# Patient Record
Sex: FEMALE | Race: BLACK OR AFRICAN AMERICAN | NOT HISPANIC OR LATINO | Employment: UNEMPLOYED | URBAN - METROPOLITAN AREA
[De-identification: names, ages, dates, MRNs, and addresses within clinical notes are randomized per-mention and may not be internally consistent; named-entity substitution may affect disease eponyms.]

---

## 2017-05-17 LAB — POC BETA-HCG (QUAL): NEGATIVE

## 2017-05-31 ENCOUNTER — HISTORICAL (OUTPATIENT)
Dept: ADMINISTRATIVE | Facility: HOSPITAL | Age: 22
End: 2017-05-31

## 2017-05-31 LAB
BUN SERPL-MCNC: 7 MG/DL (ref 7–25)
CALCIUM SERPL-MCNC: 9.5 MG/DL (ref 8.4–10.3)
CHLORIDE SERPL-SCNC: 105 MMOL/L (ref 96–110)
CO2 SERPL-SCNC: 26 MMOL/L (ref 24–32)
CREAT SERPL-MCNC: 0.67 MG/DL (ref 0.7–1.1)
CRP SERPL-MCNC: <0.5 MG/DL
ERYTHROCYTE [SEDIMENTATION RATE] IN BLOOD: 8 MM/HR (ref 0–20)
GLUCOSE SERPL-MCNC: 80 MG/DL (ref 65–99)
HIV 1+2 AB+HIV1 P24 AG SERPL QL IA: NONREACTIVE
POTASSIUM SERPL-SCNC: 3.4 MMOL/L (ref 3.6–5.2)
RPR SER QL: NORMAL
SODIUM SERPL-SCNC: 139 MMOL/L (ref 135–146)

## 2018-10-10 ENCOUNTER — HISTORICAL (OUTPATIENT)
Dept: ADMINISTRATIVE | Facility: HOSPITAL | Age: 23
End: 2018-10-10

## 2018-10-10 LAB
ABS NEUT (OLG): 1.84 X10(3)/MCL (ref 2.1–9.2)
BASOPHILS # BLD AUTO: 0.09 X10(3)/MCL
BASOPHILS NFR BLD AUTO: 2 %
BUN SERPL-MCNC: 6 MG/DL (ref 7–18)
CALCIUM SERPL-MCNC: 8.8 MG/DL (ref 8.5–10.1)
CHLORIDE SERPL-SCNC: 104 MMOL/L (ref 98–107)
CO2 SERPL-SCNC: 29 MMOL/L (ref 21–32)
CREAT SERPL-MCNC: 0.7 MG/DL (ref 0.6–1.3)
CREAT/UREA NIT SERPL: 9
EOSINOPHIL # BLD AUTO: 0.11 X10(3)/MCL
EOSINOPHIL NFR BLD AUTO: 2 %
ERYTHROCYTE [DISTWIDTH] IN BLOOD BY AUTOMATED COUNT: 14.1 % (ref 11.5–14.5)
GLUCOSE SERPL-MCNC: 75 MG/DL (ref 74–106)
HCT VFR BLD AUTO: 33.6 % (ref 35–46)
HGB BLD-MCNC: 11.7 GM/DL (ref 12–16)
LYMPHOCYTES # BLD AUTO: 2.14 X10(3)/MCL
LYMPHOCYTES NFR BLD AUTO: 48 % (ref 13–40)
MCH RBC QN AUTO: 30.1 PG (ref 26–34)
MCHC RBC AUTO-ENTMCNC: 34.8 GM/DL (ref 31–37)
MCV RBC AUTO: 86.4 FL (ref 80–100)
MONOCYTES # BLD AUTO: 0.28 X10(3)/MCL
MONOCYTES NFR BLD AUTO: 6 % (ref 4–12)
NEUTROPHILS # BLD AUTO: 1.84 X10(3)/MCL
NEUTROPHILS NFR BLD AUTO: 41 X10(3)/MCL
PLATELET # BLD AUTO: 285 X10(3)/MCL (ref 130–400)
PMV BLD AUTO: 11.4 FL (ref 7.4–10.4)
POTASSIUM SERPL-SCNC: 3.8 MMOL/L (ref 3.5–5.1)
RBC # BLD AUTO: 3.89 X10(6)/MCL (ref 4–5.2)
SODIUM SERPL-SCNC: 138 MMOL/L (ref 136–145)
TSH SERPL-ACNC: 1.2 MIU/L (ref 0.36–3.74)
WBC # SPEC AUTO: 4.5 X10(3)/MCL (ref 4.5–11)

## 2018-10-22 LAB — POC BETA-HCG (QUAL): NEGATIVE

## 2019-02-12 LAB
HIGH RISK HPV 16 (PRECISION): NEGATIVE
HIGH RISK HPV 18/45 (PRECISION): NEGATIVE
PAP RECOMMENDATION EXT: ABNORMAL
PAP SMEAR: ABNORMAL
POC BETA-HCG (QUAL): NEGATIVE

## 2019-05-23 LAB — POC BETA-HCG (QUAL): NEGATIVE

## 2020-11-11 ENCOUNTER — HISTORICAL (OUTPATIENT)
Dept: ADMINISTRATIVE | Facility: HOSPITAL | Age: 25
End: 2020-11-11

## 2020-11-11 LAB
ALBUMIN SERPL-MCNC: 4.5 GM/DL (ref 3.5–5)
ALP SERPL-CCNC: 56 UNIT/L (ref 40–150)
ALT SERPL-CCNC: 14 UNIT/L (ref 0–55)
AST SERPL-CCNC: 18 UNIT/L (ref 5–34)
BILIRUB SERPL-MCNC: 0.4 MG/DL
BILIRUBIN DIRECT+TOT PNL SERPL-MCNC: 0.2 MG/DL (ref 0–0.5)
BILIRUBIN DIRECT+TOT PNL SERPL-MCNC: 0.2 MG/DL (ref 0–0.8)
HIV 1+2 AB+HIV1 P24 AG SERPL QL IA: NONREACTIVE
PAP RECOMMENDATION EXT: NORMAL
PAP SMEAR: NORMAL
PROT SERPL-MCNC: 8 GM/DL (ref 6.4–8.3)
T PALLIDUM AB SER QL: NONREACTIVE

## 2021-05-24 ENCOUNTER — HISTORICAL (OUTPATIENT)
Dept: ADMINISTRATIVE | Facility: HOSPITAL | Age: 26
End: 2021-05-24

## 2021-05-24 LAB
HIV 1+2 AB+HIV1 P24 AG SERPL QL IA: NONREACTIVE
T PALLIDUM AB SER QL: NONREACTIVE

## 2021-11-17 ENCOUNTER — HISTORICAL (OUTPATIENT)
Dept: ADMINISTRATIVE | Facility: HOSPITAL | Age: 26
End: 2021-11-17

## 2021-11-17 LAB
ALBUMIN SERPL-MCNC: 4.5 GM/DL (ref 3.5–5)
ALBUMIN/GLOB SERPL: 1.2 RATIO (ref 1.1–2)
ALP SERPL-CCNC: 46 UNIT/L (ref 40–150)
ALT SERPL-CCNC: 8 UNIT/L (ref 0–55)
AST SERPL-CCNC: 18 UNIT/L (ref 5–34)
BILIRUB SERPL-MCNC: 0.9 MG/DL
BILIRUBIN DIRECT+TOT PNL SERPL-MCNC: 0.4 MG/DL (ref 0–0.5)
BILIRUBIN DIRECT+TOT PNL SERPL-MCNC: 0.5 MG/DL (ref 0–0.8)
BUN SERPL-MCNC: 5.5 MG/DL (ref 7–18.7)
CALCIUM SERPL-MCNC: 9.8 MG/DL (ref 8.7–10.5)
CHLORIDE SERPL-SCNC: 103 MMOL/L (ref 98–107)
CO2 SERPL-SCNC: 27 MMOL/L (ref 22–29)
CREAT SERPL-MCNC: 0.77 MG/DL (ref 0.55–1.02)
GLOBULIN SER-MCNC: 3.7 GM/DL (ref 2.4–3.5)
GLUCOSE SERPL-MCNC: 81 MG/DL (ref 74–100)
POTASSIUM SERPL-SCNC: 3.9 MMOL/L (ref 3.5–5.1)
PROT SERPL-MCNC: 8.2 GM/DL (ref 6.4–8.3)
SODIUM SERPL-SCNC: 137 MMOL/L (ref 136–145)
TSH SERPL-ACNC: 0.85 UIU/ML (ref 0.35–4.94)

## 2022-04-10 ENCOUNTER — HISTORICAL (OUTPATIENT)
Dept: ADMINISTRATIVE | Facility: HOSPITAL | Age: 27
End: 2022-04-10
Payer: MEDICAID

## 2022-04-29 VITALS
HEIGHT: 60 IN | OXYGEN SATURATION: 100 % | BODY MASS INDEX: 21.94 KG/M2 | DIASTOLIC BLOOD PRESSURE: 76 MMHG | SYSTOLIC BLOOD PRESSURE: 109 MMHG | WEIGHT: 111.75 LBS

## 2022-06-06 ENCOUNTER — OFFICE VISIT (OUTPATIENT)
Dept: FAMILY MEDICINE | Facility: CLINIC | Age: 27
End: 2022-06-06
Payer: MEDICAID

## 2022-06-06 VITALS
TEMPERATURE: 98 F | BODY MASS INDEX: 22.9 KG/M2 | OXYGEN SATURATION: 100 % | WEIGHT: 116.63 LBS | HEIGHT: 60 IN | RESPIRATION RATE: 20 BRPM | HEART RATE: 75 BPM | DIASTOLIC BLOOD PRESSURE: 84 MMHG | SYSTOLIC BLOOD PRESSURE: 120 MMHG

## 2022-06-06 DIAGNOSIS — F31.9 BIPOLAR AFFECTIVE DISORDER, REMISSION STATUS UNSPECIFIED: Primary | ICD-10-CM

## 2022-06-06 PROCEDURE — 99214 OFFICE O/P EST MOD 30 MIN: CPT | Mod: PBBFAC

## 2022-06-06 PROCEDURE — 93005 ELECTROCARDIOGRAM TRACING: CPT

## 2022-06-06 RX ORDER — QUETIAPINE FUMARATE 50 MG/1
50 TABLET, FILM COATED ORAL DAILY
Qty: 30 TABLET | Refills: 0 | Status: SHIPPED | OUTPATIENT
Start: 2022-06-06 | End: 2022-07-19 | Stop reason: ALTCHOICE
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
## 2022-06-22 ENCOUNTER — OFFICE VISIT (OUTPATIENT)
Dept: FAMILY MEDICINE | Facility: CLINIC | Age: 27
End: 2022-06-22
Payer: MEDICAID

## 2022-06-22 DIAGNOSIS — F31.32 BIPOLAR 1 DISORDER, DEPRESSED, MODERATE: Primary | ICD-10-CM

## 2022-06-22 PROCEDURE — 99417 PROLNG OP E/M EACH 15 MIN: CPT | Mod: 95,,, | Performed by: NURSE PRACTITIONER

## 2022-06-22 PROCEDURE — 99417 PR PROLONGED SVC, OUTPT, W/WO DIRECT PT CONTACT,  EA ADDTL 15 MIN: ICD-10-PCS | Mod: 95,,, | Performed by: NURSE PRACTITIONER

## 2022-06-22 PROCEDURE — 1159F PR MEDICATION LIST DOCUMENTED IN MEDICAL RECORD: ICD-10-PCS | Mod: CPTII,95,, | Performed by: NURSE PRACTITIONER

## 2022-06-22 PROCEDURE — 1159F MED LIST DOCD IN RCRD: CPT | Mod: CPTII,95,, | Performed by: NURSE PRACTITIONER

## 2022-06-22 PROCEDURE — 99205 PR OFFICE/OUTPT VISIT, NEW, LEVL V, 60-74 MIN: ICD-10-PCS | Mod: 95,,, | Performed by: NURSE PRACTITIONER

## 2022-06-22 PROCEDURE — 99205 OFFICE O/P NEW HI 60 MIN: CPT | Mod: 95,,, | Performed by: NURSE PRACTITIONER

## 2022-06-22 RX ORDER — ESCITALOPRAM OXALATE 10 MG/1
10 TABLET ORAL DAILY
COMMUNITY
Start: 2022-05-02 | End: 2022-07-19 | Stop reason: ALTCHOICE
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
## 2022-06-22 NOTE — PATIENT INSTRUCTIONS
Meds as directed  Keep all healthcare appointments  Utilize self-interventions from patient education materials  Nearest ER if  overwhelmed   Clinic 4 weeks

## 2022-07-19 ENCOUNTER — OFFICE VISIT (OUTPATIENT)
Dept: BEHAVIORAL HEALTH | Facility: CLINIC | Age: 27
End: 2022-07-19
Payer: MEDICAID

## 2022-07-19 VITALS
HEART RATE: 73 BPM | HEIGHT: 60 IN | RESPIRATION RATE: 20 BRPM | BODY MASS INDEX: 23.68 KG/M2 | SYSTOLIC BLOOD PRESSURE: 112 MMHG | DIASTOLIC BLOOD PRESSURE: 79 MMHG | OXYGEN SATURATION: 100 % | WEIGHT: 120.63 LBS

## 2022-07-19 DIAGNOSIS — F31.32 BIPOLAR 1 DISORDER, DEPRESSED, MODERATE: Primary | ICD-10-CM

## 2022-07-19 DIAGNOSIS — F43.12 CHRONIC POST-TRAUMATIC STRESS DISORDER (PTSD): ICD-10-CM

## 2022-07-19 PROCEDURE — 3008F PR BODY MASS INDEX (BMI) DOCUMENTED: ICD-10-PCS | Mod: CPTII,,, | Performed by: STUDENT IN AN ORGANIZED HEALTH CARE EDUCATION/TRAINING PROGRAM

## 2022-07-19 PROCEDURE — 99215 PR OFFICE/OUTPT VISIT, EST, LEVL V, 40-54 MIN: ICD-10-PCS | Mod: AF,HB,S$PBB, | Performed by: STUDENT IN AN ORGANIZED HEALTH CARE EDUCATION/TRAINING PROGRAM

## 2022-07-19 PROCEDURE — 1160F PR REVIEW ALL MEDS BY PRESCRIBER/CLIN PHARMACIST DOCUMENTED: ICD-10-PCS | Mod: CPTII,,, | Performed by: STUDENT IN AN ORGANIZED HEALTH CARE EDUCATION/TRAINING PROGRAM

## 2022-07-19 PROCEDURE — 1159F PR MEDICATION LIST DOCUMENTED IN MEDICAL RECORD: ICD-10-PCS | Mod: CPTII,,, | Performed by: STUDENT IN AN ORGANIZED HEALTH CARE EDUCATION/TRAINING PROGRAM

## 2022-07-19 PROCEDURE — 99213 OFFICE O/P EST LOW 20 MIN: CPT | Mod: PBBFAC,PN | Performed by: STUDENT IN AN ORGANIZED HEALTH CARE EDUCATION/TRAINING PROGRAM

## 2022-07-19 PROCEDURE — 3074F PR MOST RECENT SYSTOLIC BLOOD PRESSURE < 130 MM HG: ICD-10-PCS | Mod: CPTII,,, | Performed by: STUDENT IN AN ORGANIZED HEALTH CARE EDUCATION/TRAINING PROGRAM

## 2022-07-19 PROCEDURE — 3008F BODY MASS INDEX DOCD: CPT | Mod: CPTII,,, | Performed by: STUDENT IN AN ORGANIZED HEALTH CARE EDUCATION/TRAINING PROGRAM

## 2022-07-19 PROCEDURE — 3078F DIAST BP <80 MM HG: CPT | Mod: CPTII,,, | Performed by: STUDENT IN AN ORGANIZED HEALTH CARE EDUCATION/TRAINING PROGRAM

## 2022-07-19 PROCEDURE — 1159F MED LIST DOCD IN RCRD: CPT | Mod: CPTII,,, | Performed by: STUDENT IN AN ORGANIZED HEALTH CARE EDUCATION/TRAINING PROGRAM

## 2022-07-19 PROCEDURE — 99215 OFFICE O/P EST HI 40 MIN: CPT | Mod: AF,HB,S$PBB, | Performed by: STUDENT IN AN ORGANIZED HEALTH CARE EDUCATION/TRAINING PROGRAM

## 2022-07-19 PROCEDURE — 1160F RVW MEDS BY RX/DR IN RCRD: CPT | Mod: CPTII,,, | Performed by: STUDENT IN AN ORGANIZED HEALTH CARE EDUCATION/TRAINING PROGRAM

## 2022-07-19 PROCEDURE — 3074F SYST BP LT 130 MM HG: CPT | Mod: CPTII,,, | Performed by: STUDENT IN AN ORGANIZED HEALTH CARE EDUCATION/TRAINING PROGRAM

## 2022-07-19 PROCEDURE — 3078F PR MOST RECENT DIASTOLIC BLOOD PRESSURE < 80 MM HG: ICD-10-PCS | Mod: CPTII,,, | Performed by: STUDENT IN AN ORGANIZED HEALTH CARE EDUCATION/TRAINING PROGRAM

## 2022-07-19 RX ORDER — LITHIUM CARBONATE 300 MG/1
300 CAPSULE ORAL 2 TIMES DAILY
Qty: 60 CAPSULE | Refills: 2 | Status: SHIPPED | OUTPATIENT
Start: 2022-07-19 | End: 2023-02-22
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
## 2022-08-03 ENCOUNTER — OFFICE VISIT (OUTPATIENT)
Dept: FAMILY MEDICINE | Facility: CLINIC | Age: 27
End: 2022-08-03
Payer: MEDICAID

## 2022-08-03 DIAGNOSIS — F31.32 BIPOLAR 1 DISORDER, DEPRESSED, MODERATE: Primary | ICD-10-CM

## 2022-08-03 DIAGNOSIS — F43.10 POSTTRAUMATIC STRESS DISORDER: ICD-10-CM

## 2022-08-03 PROCEDURE — 1159F MED LIST DOCD IN RCRD: CPT | Mod: CPTII,95,SA,HB | Performed by: NURSE PRACTITIONER

## 2022-08-03 PROCEDURE — 99215 OFFICE O/P EST HI 40 MIN: CPT | Mod: 95,SA,HB, | Performed by: NURSE PRACTITIONER

## 2022-08-03 PROCEDURE — 1159F PR MEDICATION LIST DOCUMENTED IN MEDICAL RECORD: ICD-10-PCS | Mod: CPTII,95,SA,HB | Performed by: NURSE PRACTITIONER

## 2022-08-03 PROCEDURE — 90836 PSYTX W PT W E/M 45 MIN: CPT | Mod: 95,SA,HB, | Performed by: NURSE PRACTITIONER

## 2022-08-03 PROCEDURE — 90836 PR PSYCHOTHERAPY W/PATIENT W/E&M, 45 MIN (ADD ON): ICD-10-PCS | Mod: 95,SA,HB, | Performed by: NURSE PRACTITIONER

## 2022-08-03 PROCEDURE — 99215 PR OFFICE/OUTPT VISIT, EST, LEVL V, 40-54 MIN: ICD-10-PCS | Mod: 95,SA,HB, | Performed by: NURSE PRACTITIONER

## 2022-08-03 NOTE — PATIENT INSTRUCTIONS
Meds as directed--arrange to have mother  Lithium Rx today  Check with MD or pharmacy to determine if tapering off Lamictal is needed before beginning Lithium  Keep all healthcare appointments  Utilize self-interventions from patient education materials  Nearest ER if overwhelmed   Clinic 4 weeks
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
## 2022-09-14 ENCOUNTER — OFFICE VISIT (OUTPATIENT)
Dept: FAMILY MEDICINE | Facility: CLINIC | Age: 27
End: 2022-09-14
Payer: MEDICAID

## 2022-09-14 VITALS
RESPIRATION RATE: 20 BRPM | OXYGEN SATURATION: 100 % | SYSTOLIC BLOOD PRESSURE: 103 MMHG | HEART RATE: 70 BPM | HEIGHT: 60 IN | TEMPERATURE: 98 F | DIASTOLIC BLOOD PRESSURE: 70 MMHG | BODY MASS INDEX: 23.77 KG/M2 | WEIGHT: 121.06 LBS

## 2022-09-14 DIAGNOSIS — F43.10 POSTTRAUMATIC STRESS DISORDER: ICD-10-CM

## 2022-09-14 DIAGNOSIS — F31.32 BIPOLAR 1 DISORDER, DEPRESSED, MODERATE: Primary | ICD-10-CM

## 2022-09-14 PROCEDURE — 90838 PR PSYCHOTHERAPY W/PATIENT W/E&M, 60 MIN (ADD ON): ICD-10-PCS | Mod: S$PBB,SA,HB, | Performed by: NURSE PRACTITIONER

## 2022-09-14 PROCEDURE — 1159F PR MEDICATION LIST DOCUMENTED IN MEDICAL RECORD: ICD-10-PCS | Mod: CPTII,SA,HB, | Performed by: NURSE PRACTITIONER

## 2022-09-14 PROCEDURE — 99213 OFFICE O/P EST LOW 20 MIN: CPT | Mod: S$PBB,SA,HB, | Performed by: NURSE PRACTITIONER

## 2022-09-14 PROCEDURE — 90838 PSYTX W PT W E/M 60 MIN: CPT | Mod: S$PBB,SA,HB, | Performed by: NURSE PRACTITIONER

## 2022-09-14 PROCEDURE — 3074F SYST BP LT 130 MM HG: CPT | Mod: CPTII,SA,HB, | Performed by: NURSE PRACTITIONER

## 2022-09-14 PROCEDURE — 90838 PSYTX W PT W E/M 60 MIN: CPT | Mod: PBBFAC | Performed by: NURSE PRACTITIONER

## 2022-09-14 PROCEDURE — 3008F BODY MASS INDEX DOCD: CPT | Mod: CPTII,SA,HB, | Performed by: NURSE PRACTITIONER

## 2022-09-14 PROCEDURE — 3078F PR MOST RECENT DIASTOLIC BLOOD PRESSURE < 80 MM HG: ICD-10-PCS | Mod: CPTII,SA,HB, | Performed by: NURSE PRACTITIONER

## 2022-09-14 PROCEDURE — 99213 PR OFFICE/OUTPT VISIT, EST, LEVL III, 20-29 MIN: ICD-10-PCS | Mod: S$PBB,SA,HB, | Performed by: NURSE PRACTITIONER

## 2022-09-14 PROCEDURE — 99214 OFFICE O/P EST MOD 30 MIN: CPT | Mod: PBBFAC | Performed by: NURSE PRACTITIONER

## 2022-09-14 PROCEDURE — 3078F DIAST BP <80 MM HG: CPT | Mod: CPTII,SA,HB, | Performed by: NURSE PRACTITIONER

## 2022-09-14 PROCEDURE — 1159F MED LIST DOCD IN RCRD: CPT | Mod: CPTII,SA,HB, | Performed by: NURSE PRACTITIONER

## 2022-09-14 PROCEDURE — 3074F PR MOST RECENT SYSTOLIC BLOOD PRESSURE < 130 MM HG: ICD-10-PCS | Mod: CPTII,SA,HB, | Performed by: NURSE PRACTITIONER

## 2022-09-14 PROCEDURE — 3008F PR BODY MASS INDEX (BMI) DOCUMENTED: ICD-10-PCS | Mod: CPTII,SA,HB, | Performed by: NURSE PRACTITIONER

## 2022-09-14 NOTE — PATIENT INSTRUCTIONS
Meds as directed  Keep all healthcare appointments  Utilize deep breathing and visualization  Obtain Anxiety/Worry Workbook by [employer].  Begin reading and completing worksheets  Utilize self-interventions from patient education materials  Nearest ER if overwhelmed   Clinic 4  weeks
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
## 2022-09-21 ENCOUNTER — HISTORICAL (OUTPATIENT)
Dept: ADMINISTRATIVE | Facility: HOSPITAL | Age: 27
End: 2022-09-21
Payer: MEDICAID

## 2022-09-26 ENCOUNTER — OFFICE VISIT (OUTPATIENT)
Dept: BEHAVIORAL HEALTH | Facility: CLINIC | Age: 27
End: 2022-09-26
Payer: MEDICAID

## 2022-09-26 VITALS
RESPIRATION RATE: 20 BRPM | HEART RATE: 70 BPM | SYSTOLIC BLOOD PRESSURE: 110 MMHG | BODY MASS INDEX: 23.32 KG/M2 | WEIGHT: 118.81 LBS | OXYGEN SATURATION: 99 % | DIASTOLIC BLOOD PRESSURE: 64 MMHG | HEIGHT: 60 IN

## 2022-09-26 DIAGNOSIS — F43.12 CHRONIC POST-TRAUMATIC STRESS DISORDER (PTSD): ICD-10-CM

## 2022-09-26 DIAGNOSIS — F31.32 BIPOLAR 1 DISORDER, DEPRESSED, MODERATE: Primary | ICD-10-CM

## 2022-09-26 PROCEDURE — 3078F PR MOST RECENT DIASTOLIC BLOOD PRESSURE < 80 MM HG: ICD-10-PCS | Mod: CPTII,,, | Performed by: STUDENT IN AN ORGANIZED HEALTH CARE EDUCATION/TRAINING PROGRAM

## 2022-09-26 PROCEDURE — 3074F SYST BP LT 130 MM HG: CPT | Mod: CPTII,,, | Performed by: STUDENT IN AN ORGANIZED HEALTH CARE EDUCATION/TRAINING PROGRAM

## 2022-09-26 PROCEDURE — 3078F DIAST BP <80 MM HG: CPT | Mod: CPTII,,, | Performed by: STUDENT IN AN ORGANIZED HEALTH CARE EDUCATION/TRAINING PROGRAM

## 2022-09-26 PROCEDURE — 3008F BODY MASS INDEX DOCD: CPT | Mod: CPTII,,, | Performed by: STUDENT IN AN ORGANIZED HEALTH CARE EDUCATION/TRAINING PROGRAM

## 2022-09-26 PROCEDURE — 99213 OFFICE O/P EST LOW 20 MIN: CPT | Mod: PBBFAC,PN | Performed by: STUDENT IN AN ORGANIZED HEALTH CARE EDUCATION/TRAINING PROGRAM

## 2022-09-26 PROCEDURE — 3074F PR MOST RECENT SYSTOLIC BLOOD PRESSURE < 130 MM HG: ICD-10-PCS | Mod: CPTII,,, | Performed by: STUDENT IN AN ORGANIZED HEALTH CARE EDUCATION/TRAINING PROGRAM

## 2022-09-26 PROCEDURE — 3008F PR BODY MASS INDEX (BMI) DOCUMENTED: ICD-10-PCS | Mod: CPTII,,, | Performed by: STUDENT IN AN ORGANIZED HEALTH CARE EDUCATION/TRAINING PROGRAM

## 2022-09-26 PROCEDURE — 99213 OFFICE O/P EST LOW 20 MIN: CPT | Mod: AF,HB,S$PBB, | Performed by: STUDENT IN AN ORGANIZED HEALTH CARE EDUCATION/TRAINING PROGRAM

## 2022-09-26 PROCEDURE — 1159F MED LIST DOCD IN RCRD: CPT | Mod: CPTII,,, | Performed by: STUDENT IN AN ORGANIZED HEALTH CARE EDUCATION/TRAINING PROGRAM

## 2022-09-26 PROCEDURE — 99213 PR OFFICE/OUTPT VISIT, EST, LEVL III, 20-29 MIN: ICD-10-PCS | Mod: AF,HB,S$PBB, | Performed by: STUDENT IN AN ORGANIZED HEALTH CARE EDUCATION/TRAINING PROGRAM

## 2022-09-26 PROCEDURE — 1160F PR REVIEW ALL MEDS BY PRESCRIBER/CLIN PHARMACIST DOCUMENTED: ICD-10-PCS | Mod: CPTII,,, | Performed by: STUDENT IN AN ORGANIZED HEALTH CARE EDUCATION/TRAINING PROGRAM

## 2022-09-26 PROCEDURE — 1160F RVW MEDS BY RX/DR IN RCRD: CPT | Mod: CPTII,,, | Performed by: STUDENT IN AN ORGANIZED HEALTH CARE EDUCATION/TRAINING PROGRAM

## 2022-09-26 PROCEDURE — 1159F PR MEDICATION LIST DOCUMENTED IN MEDICAL RECORD: ICD-10-PCS | Mod: CPTII,,, | Performed by: STUDENT IN AN ORGANIZED HEALTH CARE EDUCATION/TRAINING PROGRAM

## 2022-09-26 NOTE — PROGRESS NOTES
"Outpatient Psychiatry Follow-Up Visit    9/26/2022    Clinical Status of Patient:  Outpatient (Ambulatory)    Chief Complaint:  Amanda Coffman is a 27 y.o. female who presents today for follow-up of bipolar disorder and PTSD. Patient last seen for initial evaluation on 7/19/2022. Of note, pt no show for appt 8/22/2022.  Met with patient.      Interval History and Content of Current Session:  Interim Events/Subjective Report/Content of Current Session:   Pt reports feeling "ok" since last visit.  Mood "pretty good," denies overt depression or joseph/hypomania.  Sleeping "ok," sleeping 8 hrs nightly, rested on awakening.  Appetite normal for pt, weight intermittently increasing and decreasing.  Some difficulty with motivation, +procrastination.  Denies SI/HI/AVH/paranoia, denies plan or desire for self harm or harm to others.  Denies increased urination or nocturia.  Denies tremor.  Denies SE from current med regimen.  Has not gotten labwork done after last visit.      Psychiatric Review of Systems-is patient experiencing or having changes in  Anxiety: better  Sleep: good, 8 hrs nightly  Appetite: stable  Weight: "up and down"  Energy: good  Concentration: good   Libido: denies problem  Irritability: denies  Motivation: some difficulty  Guilt/hopelessness: denies  Paranoia/delusions: denies  SIB/risky behavior: denies    Review of Systems   PSYCHIATRIC: Pertinant items are noted in the narrative.  CONSTITUTIONAL: No weight gain or loss.  MUSCULOSKELETAL: No pain or stiffness of the joints.  NEUROLOGIC: No weakness, sensory changes, seizures, confusion, memory loss, tremor or other abnormal movements.  CARDIAC: No CP, no palpitations  RESPIRATORY: No shortness of breath.  CARDIOVASCULAR: No tachycardia or chest pain.  GASTROINTESTINAL: No nausea, vomiting, pain, constipation or diarrhea.    Past Medical, Family and Social History: The patient's past medical, family and social history have been reviewed and " "updated as appropriate within the electronic medical record - see encounter notes.    Compliance: sometimes misses doses     Side effects: denies    Risk Parameters:  Patient reports no suicidal ideation  Patient reports no homicidal ideation  Patient reports no self-injurious behavior  Patient reports no violent behavior    Exam (detailed: at least 9 elements; comprehensive: all 15 elements)   Constitutional  Vitals:  Most recent vital signs, dated less than 90 days prior to this appointment, were reviewed.     Vitals:    09/26/22 1143   BP: 110/64   Pulse: 70   Resp: 20   SpO2: 99%   Weight: 53.9 kg (118 lb 12.8 oz)   Height: 5' (1.524 m)        General:   Constitutional: No acute distress, appears stated age, casually dressed    Neurologic:   Motor: moves all extremities spontaneously and without difficulty, no abnormal involuntary movements observed  Gait: normal gait and station    Mental status examination:   Appearance: appears stated age, casually dressed, no acute distress  Behavior: unremarkable for situation, calm and cooperative  Mood: "good"  Affect: mood congruent and euthymic  Thought process: linear and goal directed  Associations: appropriate for conversation  Thought content: no plan or desire for self harm or harm to others, denies paranoia, no delusional ideation volunteered  Perceptions: denies hallucinations or other altered perceptions  Orientation: oriented to day of week, month, year, location, and situation  Language: English, fluid  Attention: able to attend to interview  Insight: good  Judgement: good    PHQ9 9/26/2022   Total Score 6       GAD7 9/26/2022 9/14/2022 7/19/2022   1. Feeling nervous, anxious, or on edge? 0 1 0   2. Not being able to stop or control worrying? 3 3 0   3. Worrying too much about different things? 3 3 1   4. Trouble relaxing? 3 2 0   5. Being so restless that it is hard to sit still? 0 0 0   6. Becoming easily annoyed or irritable? 2 3 0   7. Feeling afraid as if " something awful might happen? 1 1 2   8. If you checked off any problems, how difficult have these problems made it for you to do your work, take care of things at home, or get along with other people? 1 1 1   EVERETT-7 Score 12 13 3     Assessment and Diagnosis   Status/Progress: Based on the examination today, the patient's problem(s) is/are improved.  New problems have not been presented today.   Co-morbidities and Lack of compliance are not complicating management of the primary condition.       General Impression:    ICD-10-CM ICD-9-CM   1. Bipolar 1 disorder, depressed, moderate  F31.32 296.52   2. Chronic post-traumatic stress disorder (PTSD)  F43.12 309.81     Intervention/Counseling/Treatment Plan   Continue lithium 300mg bid, discussed option to consolidate to single nighttime dose, discussed importance of labwork to monitor for SE and guide dosing  Consider trial of prazosin for post traumatic symptoms  Recommend pt continue with current counselor  Will obtain: CBC, CMP, TSH, UDS, hemoglobin a1c, FLP, lithium level  No need for PEC as pt is not an imminent danger to self or others or gravely disabled due to acute psychiatric illness  Discussed that pt should either call clinic for psychiatric crisis symptoms or present to nearest emergency room    Discussed with patient informed consent including diagnosis, risks and benefits of proposed treatment above vs. alternative treatments vs. no treatment, as well as serious and common side effects of these treatments, and the inherent unpredictability of individual responses to these treatments. The patient expresses understanding of the above and displays the capacity to agree with this current plan. Patient also agrees that, currently, the benefits outweigh the risks and would like to pursue treatment at this time, and had no other questions.    Instructions:  Take all medications as prescribed.    Abstain from recreational drugs and alcohol.  Present to ED or call  911 for SI/HI plan or intent, psychosis, or medical emergency.    Return to Clinic: Follow up in about 8 weeks (around 11/21/2022).    Total time:   Complexity (level) of medical decision making employed in the encounter: MODERATE    The total time for services performed on the date of the encounter: 15 minutes    Iftikhar Valdez MD  [facility]

## 2023-02-22 ENCOUNTER — OFFICE VISIT (OUTPATIENT)
Dept: FAMILY MEDICINE | Facility: CLINIC | Age: 28
End: 2023-02-22
Payer: MEDICAID

## 2023-02-22 VITALS
HEIGHT: 60 IN | BODY MASS INDEX: 23.24 KG/M2 | SYSTOLIC BLOOD PRESSURE: 107 MMHG | HEART RATE: 77 BPM | WEIGHT: 118.38 LBS | OXYGEN SATURATION: 99 % | TEMPERATURE: 98 F | DIASTOLIC BLOOD PRESSURE: 74 MMHG | RESPIRATION RATE: 18 BRPM

## 2023-02-22 DIAGNOSIS — Z23 IMMUNIZATION DUE: ICD-10-CM

## 2023-02-22 DIAGNOSIS — Z00.00 HEALTH CARE MAINTENANCE: ICD-10-CM

## 2023-02-22 DIAGNOSIS — Z11.59 ENCOUNTER FOR HEPATITIS C SCREENING TEST FOR LOW RISK PATIENT: ICD-10-CM

## 2023-02-22 DIAGNOSIS — F31.9 BIPOLAR I DISORDER WITH DEPRESSION: Primary | ICD-10-CM

## 2023-02-22 PROBLEM — D57.3 SICKLE CELL TRAIT: Status: ACTIVE | Noted: 2023-02-22

## 2023-02-22 PROBLEM — F41.9 ANXIETY: Status: ACTIVE | Noted: 2023-02-22

## 2023-02-22 LAB
BASOPHILS # BLD AUTO: 0.08 X10(3)/MCL (ref 0–0.2)
BASOPHILS NFR BLD AUTO: 1.4 %
CHOLEST SERPL-MCNC: 146 MG/DL
CHOLEST/HDLC SERPL: 2 {RATIO} (ref 0–5)
DEPRECATED CALCIDIOL+CALCIFEROL SERPL-MC: 7.9 NG/ML (ref 30–80)
EOSINOPHIL # BLD AUTO: 0.08 X10(3)/MCL (ref 0–0.9)
EOSINOPHIL NFR BLD AUTO: 1.4 %
ERYTHROCYTE [DISTWIDTH] IN BLOOD BY AUTOMATED COUNT: 14.4 % (ref 11.5–17)
EST. AVERAGE GLUCOSE BLD GHB EST-MCNC: 88.2 MG/DL
HBA1C MFR BLD: 4.7 %
HCT VFR BLD AUTO: 33.4 % (ref 37–47)
HCV AB SERPL QL IA: NONREACTIVE
HDLC SERPL-MCNC: 72 MG/DL (ref 35–60)
HGB BLD-MCNC: 11.5 G/DL (ref 12–16)
IMM GRANULOCYTES # BLD AUTO: 0.01 X10(3)/MCL (ref 0–0.04)
IMM GRANULOCYTES NFR BLD AUTO: 0.2 %
LDLC SERPL CALC-MCNC: 67 MG/DL (ref 50–140)
LYMPHOCYTES # BLD AUTO: 2.33 X10(3)/MCL (ref 0.6–4.6)
LYMPHOCYTES NFR BLD AUTO: 40.9 %
MCH RBC QN AUTO: 29.5 PG
MCHC RBC AUTO-ENTMCNC: 34.4 G/DL (ref 33–36)
MCV RBC AUTO: 85.6 FL (ref 80–94)
MONOCYTES # BLD AUTO: 0.48 X10(3)/MCL (ref 0.1–1.3)
MONOCYTES NFR BLD AUTO: 8.4 %
NEUTROPHILS # BLD AUTO: 2.71 X10(3)/MCL (ref 2.1–9.2)
NEUTROPHILS NFR BLD AUTO: 47.7 %
NRBC BLD AUTO-RTO: 0 %
PLATELET # BLD AUTO: 223 X10(3)/MCL (ref 130–400)
PMV BLD AUTO: 12 FL (ref 7.4–10.4)
RBC # BLD AUTO: 3.9 X10(6)/MCL (ref 4.2–5.4)
TRIGL SERPL-MCNC: 33 MG/DL (ref 37–140)
TSH SERPL-ACNC: 1.94 UIU/ML (ref 0.35–4.94)
VLDLC SERPL CALC-MCNC: 7 MG/DL
WBC # SPEC AUTO: 5.7 X10(3)/MCL (ref 4.5–11.5)

## 2023-02-22 PROCEDURE — 90471 IMMUNIZATION ADMIN: CPT | Mod: PBBFAC

## 2023-02-22 PROCEDURE — 90686 IIV4 VACC NO PRSV 0.5 ML IM: CPT | Mod: PBBFAC

## 2023-02-22 PROCEDURE — 82306 VITAMIN D 25 HYDROXY: CPT

## 2023-02-22 PROCEDURE — 90739 HEPB VACC 2/4 DOSE ADULT IM: CPT | Mod: PBBFAC

## 2023-02-22 PROCEDURE — 80061 LIPID PANEL: CPT

## 2023-02-22 PROCEDURE — 86803 HEPATITIS C AB TEST: CPT

## 2023-02-22 PROCEDURE — 83036 HEMOGLOBIN GLYCOSYLATED A1C: CPT

## 2023-02-22 PROCEDURE — 99213 OFFICE O/P EST LOW 20 MIN: CPT | Mod: PBBFAC

## 2023-02-22 PROCEDURE — 84443 ASSAY THYROID STIM HORMONE: CPT

## 2023-02-22 PROCEDURE — 85025 COMPLETE CBC W/AUTO DIFF WBC: CPT

## 2023-02-22 PROCEDURE — 36415 COLL VENOUS BLD VENIPUNCTURE: CPT

## 2023-02-22 PROCEDURE — 90472 IMMUNIZATION ADMIN EACH ADD: CPT | Mod: PBBFAC

## 2023-02-22 RX ADMIN — INFLUENZA VIRUS VACCINE 0.5 ML: 15; 15; 15; 15 SUSPENSION INTRAMUSCULAR at 12:02

## 2023-02-22 RX ADMIN — HEPATITIS B VACCINE (RECOMBINANT) ADJUVANTED 0.5 ML: 20 INJECTION, SOLUTION INTRAMUSCULAR at 12:02
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
## 2023-02-22 NOTE — PROGRESS NOTES
I have reviewed the Resident's history and physical, assessment, plan, and progress note. I agree with the findings.       Jacques Bella MD  [facility]

## 2023-03-29 ENCOUNTER — DOCUMENTATION ONLY (OUTPATIENT)
Dept: ADMINISTRATIVE | Facility: HOSPITAL | Age: 28
End: 2023-03-29

## 2023-06-13 ENCOUNTER — PATIENT MESSAGE (OUTPATIENT)
Dept: RESEARCH | Facility: HOSPITAL | Age: 28
End: 2023-06-13

## 2023-06-20 ENCOUNTER — PATIENT MESSAGE (OUTPATIENT)
Dept: RESEARCH | Facility: HOSPITAL | Age: 28
End: 2023-06-20

## 2023-07-11 ENCOUNTER — PATIENT MESSAGE (OUTPATIENT)
Dept: RESEARCH | Facility: HOSPITAL | Age: 28
End: 2023-07-11

## 2023-07-26 ENCOUNTER — OFFICE VISIT (OUTPATIENT)
Dept: FAMILY MEDICINE | Facility: CLINIC | Age: 28
End: 2023-07-26
Payer: MEDICAID

## 2023-07-26 VITALS
HEART RATE: 76 BPM | WEIGHT: 120.38 LBS | BODY MASS INDEX: 23.63 KG/M2 | DIASTOLIC BLOOD PRESSURE: 73 MMHG | RESPIRATION RATE: 17 BRPM | OXYGEN SATURATION: 100 % | HEIGHT: 60 IN | TEMPERATURE: 99 F | SYSTOLIC BLOOD PRESSURE: 107 MMHG

## 2023-07-26 DIAGNOSIS — M54.50 RIGHT-SIDED LOW BACK PAIN WITHOUT SCIATICA, UNSPECIFIED CHRONICITY: Primary | ICD-10-CM
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
## 2023-07-26 PROCEDURE — 99213 OFFICE O/P EST LOW 20 MIN: CPT | Mod: PBBFAC

## 2023-07-26 RX ORDER — DICLOFENAC SODIUM 10 MG/G
2 GEL TOPICAL 4 TIMES DAILY PRN
Qty: 100 G | Refills: 3 | Status: SHIPPED | OUTPATIENT
Start: 2023-07-26 | End: 2023-10-24

## 2023-07-26 RX ORDER — CYCLOBENZAPRINE HCL 5 MG
5 TABLET ORAL NIGHTLY
Qty: 14 TABLET | Refills: 0 | Status: SHIPPED | OUTPATIENT
Start: 2023-07-26

## 2023-07-26 RX ORDER — MELOXICAM 15 MG/1
15 TABLET ORAL DAILY
Qty: 14 TABLET | Refills: 0 | Status: SHIPPED | OUTPATIENT
Start: 2023-07-26
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
## 2023-09-20 NOTE — PROGRESS NOTES
Faculty Attestation: Patient was seen in [facility]. Patient was seen and examined by the resident.  I have personally reviewed History of the Present Illness , Review of Systems, PFSH , Physical Exam, Assessment and Plan documented by the resident. I was immediately available throughout the encounter. Importance of adherence to treatment recommendations discussed with patient at the time of the visit. Services were furnished in a primary care center in the outpatient department at a [facility]. I discussed the patient with the resident and agree with resident's findings and plan as documented in the resident note. Gracy Bowles MD

## 2024-04-25 ENCOUNTER — OFFICE VISIT (OUTPATIENT)
Dept: FAMILY MEDICINE | Facility: CLINIC | Age: 29
End: 2024-04-25
Payer: MEDICAID

## 2024-04-25 VITALS
HEART RATE: 80 BPM | SYSTOLIC BLOOD PRESSURE: 110 MMHG | DIASTOLIC BLOOD PRESSURE: 74 MMHG | WEIGHT: 118.38 LBS | HEIGHT: 60 IN | TEMPERATURE: 98 F | BODY MASS INDEX: 23.24 KG/M2 | OXYGEN SATURATION: 100 %

## 2024-04-25 DIAGNOSIS — Z01.419 ENCOUNTER FOR CERVICAL PAP SMEAR WITH PELVIC EXAM: Primary | ICD-10-CM

## 2024-04-25 DIAGNOSIS — Z11.3 SCREEN FOR STD (SEXUALLY TRANSMITTED DISEASE): ICD-10-CM

## 2024-04-25 DIAGNOSIS — Z00.00 HEALTH CARE MAINTENANCE: ICD-10-CM

## 2024-04-25 LAB
C TRACH DNA SPEC QL NAA+PROBE: NOT DETECTED
N GONORRHOEA DNA SPEC QL NAA+PROBE: NOT DETECTED
SOURCE (OHS): NORMAL

## 2024-04-25 PROCEDURE — 87591 N.GONORRHOEAE DNA AMP PROB: CPT

## 2024-04-25 PROCEDURE — 87491 CHLMYD TRACH DNA AMP PROBE: CPT

## 2024-04-25 PROCEDURE — 88174 CYTOPATH C/V AUTO IN FLUID: CPT

## 2024-04-25 PROCEDURE — 99213 OFFICE O/P EST LOW 20 MIN: CPT | Mod: PBBFAC
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
## 2024-04-26 NOTE — PROGRESS NOTES
Pt. evaluated with resident 04-25-24.  Requesting referral to [facility]; previously seen.  Gyn exam.    Assisted resident with pelvic examination (female chaperone present).  No appreciable vulvar / vaginal / cervical lesions.  Scant white discharge.    Resident's note reviewed 04-26-24.  Follow-up ordered studies.  Professional services provided in an outpatient primary care center affiliated with a teaching institution.

## 2024-04-29 LAB — PSYCHE PATHOLOGY RESULT: NORMAL

## 2024-10-31 ENCOUNTER — OFFICE VISIT (OUTPATIENT)
Dept: FAMILY MEDICINE | Facility: CLINIC | Age: 29
End: 2024-10-31
Payer: MEDICAID

## 2024-10-31 VITALS
BODY MASS INDEX: 23.16 KG/M2 | HEART RATE: 73 BPM | RESPIRATION RATE: 20 BRPM | TEMPERATURE: 99 F | SYSTOLIC BLOOD PRESSURE: 110 MMHG | HEIGHT: 60 IN | DIASTOLIC BLOOD PRESSURE: 76 MMHG | WEIGHT: 118 LBS | OXYGEN SATURATION: 100 %

## 2024-10-31 DIAGNOSIS — Z11.3 SCREEN FOR STD (SEXUALLY TRANSMITTED DISEASE): Primary | ICD-10-CM

## 2024-10-31 LAB
B-HCG UR QL: NEGATIVE
C TRACH DNA SPEC QL NAA+PROBE: NOT DETECTED
CLUE CELLS VAG QL WET PREP: ABNORMAL
CTP QC/QA: YES
N GONORRHOEA DNA SPEC QL NAA+PROBE: NOT DETECTED
SOURCE (OHS): NORMAL
T VAGINALIS VAG QL WET PREP: ABNORMAL
WBC #/AREA VAG WET PREP: ABNORMAL
YEAST SPEC QL WET PREP: ABNORMAL

## 2024-10-31 PROCEDURE — 87210 SMEAR WET MOUNT SALINE/INK: CPT

## 2024-10-31 PROCEDURE — 99213 OFFICE O/P EST LOW 20 MIN: CPT | Mod: PBBFAC

## 2024-10-31 PROCEDURE — 87591 N.GONORRHOEAE DNA AMP PROB: CPT

## 2024-10-31 PROCEDURE — 87491 CHLMYD TRACH DNA AMP PROBE: CPT
